# Patient Record
Sex: MALE | Race: WHITE
[De-identification: names, ages, dates, MRNs, and addresses within clinical notes are randomized per-mention and may not be internally consistent; named-entity substitution may affect disease eponyms.]

---

## 2022-11-07 ENCOUNTER — HOSPITAL ENCOUNTER (OUTPATIENT)
Dept: PHYSICAL THERAPY | Facility: CLINIC | Age: 57
Discharge: HOME | End: 2022-11-07

## 2022-11-07 PROCEDURE — 9900000067 HC THERAPEUTIC EXERCISE EA 15 MINS (SELF-PAY)

## 2022-11-07 PROCEDURE — 9900000066 HC EVALUATION (SELF-PAY)

## 2022-11-07 NOTE — CONSULTS
[x] [address]   Outpatient Rehabilitation &  Therapy  [address] [facility]  P: [phone]  F: [phone] [x] [address] [facility]  P: [phone]  F: [phone]        Physical Therapy Running Evaluation    Date:  2022  Patient: Abdelrahman Goldstein   : 1965  MRN: [id number]  Physician: Dr. Abdias Heck: self pay   Medical Diagnosis: R hamstring strain  Rehab Codes: M79.661  Onset date: 2019   Next 's appt.: none    Subjective:   CC: R hamstring pain with running and recurrent Marcus calf strains with running  HPI: ~3 years ago intense pain in Amrcus calf after intense training. L AT repair and no issues. ~1 year ago, running and had a R hamstring pull. Stopped running and recovered. Went to PT at [address] (balance, hamstring and glut strengthening and hamstring stretching). Supine eccentric hamstring curls 1 and 2 legged, calf raises 1 legged con and eccentric,  quad and hip flexor stretches. Had [address]. Wear pattern shows a strong heel strike.    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  Drawer   []   Hip Flex    5 Post. Drawer   []   Ext    5 Lachmans   []   ER    5 Valgus Stress   []   IR    5 Varus Stress   []   ABD    5 Alexandras   []   ADD    5 Pat-Fem Grind   []   Knee Flex     FADIRs   []   Ext    5 Hip Scouring   []   Ankle DF     VALENTÍNs   []   PF     Piriformis   []   INV     Rainas   []   EVER     Willie     []   GTE     Dieudonne's   []       OBSERVATION No Deficit Deficit Not Tested Comments   Posture       Forward Head [] [] []    Rounded Shoulders [] [] []    Kyphosis [] [] []    Lordosis [] [] []    Scoliosis [] [] []    Iliac Crest [] [] []    PSIS [] [] []    ASIS [] [] []    Genu Valgus [] [] []    Genu Varus [] [] []    Genu Recurvatum [] [] []    Pronation [] [] []    Supination [] [] []    Leg Length Discrp [] [] []    Slumped Sitting [] [] []    Palpation [] [] []    Sensation [] [] []    Edema [] [] []    Neurological [] [] []    Patellar Mobility [] [] []    Patellar Orientation [] [] []    Gait [] [x] [] Analysis:     Video Run Analysis   Pace: 930  min/mile    Shoes: Asics   Sylvia:160  spm    Frontal plane deviations:    No significant deviations       Sagittal plane deviations:     Increased knee extension at initial contact    Elevated foot ground angle at initial contact    Decreased knee flexion during swing        Other:      Recommendations:     Increase sylvia by 5% to   166-170 spm    Increase knee flexion during swing    Use a treadmill + metronome as he does not use a garmin    Issued script for [employer], [employer] or [employer]      FUNCTIONAL TESTS PAIN NO PAIN COMMENTS   Step Test  [] [] 4/6/8\"   2 legged squat [] []    1 legged squat [] [x] + valgus      Functional Test: UWRI  Score: 64 % functionally impaired     Comments:  Assessment:Patient would benefit from skilled physical therapy services in order to: improve running mechanics as he demonstrates low linda and increased overstriding. He also demonstrates non neutral hip/pelvis postures  Problems:    [x] ? Pain:      [x] ? Function: Not able to run as she wishes   [x] Postural Deviations  [x] Gait Deviations with low linda and overstriding  [x]  UWRI score is 13/36  [] Other:      STG: (to be met in 5 treatments)  ? Pain:<3/10 with running   ? Function: able to run 15miles/wk without issue  UWRI score to >24/36  Independent with Home Exercise Programs    LTG: (to be met in 10 treatments)  No pain with running  UWRI score to >30/36  Able to run without limitation  To run with a linda of >170 spm                   Patient goals:\"figure out why I have problems, correct them, and determine proper shoes. Rehab Potential:  [x] Good  [] Fair  [] Poor   Suggested Professional Referral:  [x] No  [] Yes:  Barriers to Goal Achievement[de-identified]  [x] No  [] Yes:  Domestic Concerns:  [x] No  [] Yes:    Pt. Education:  [x] Plans/Goals, Risks/Benefits discussed  [x] Home exercise program    Method of Education:   [x] Verbal   Download pro metronome and set to 166-170  Run on treadmill  Verbal cue to lift heels slightly  No need for additional flexibiity   [] Demo  [] Written     Comprehension of Education:  [] Verbalizes understanding. [] Demonstrates understanding. [x] Needs Review. [] Demonstrates/verbalizes understanding of HEP/Ed previously given.     Treatment Plan:  [x] Therapeutic Exercise    [x] Therapeutic Activity  [x] Manual Therapy   [x] Alter G treadmill  [x] Phys perf test     [x] Vasocompression/Game Ready   [x] Neuromuscular Re-education [x] Instruction in HEP     [x] Aquatic Therapy                           Frequency:  1x/week for 10 visits    Todays Treatment:  Modalities: none  Precautions:

## 2022-11-15 ENCOUNTER — HOSPITAL ENCOUNTER (OUTPATIENT)
Dept: PHYSICAL THERAPY | Facility: CLINIC | Age: 57
Discharge: HOME | End: 2022-11-15

## 2022-11-15 PROCEDURE — 9900000067 HC THERAPEUTIC EXERCISE EA 15 MINS (SELF-PAY)

## 2022-11-15 NOTE — FLOWSHEET NOTE
[x] [facility]    [address], [address] [facility]    Phone: [phone]    Fax:  [phone]       Physical Therapy Daily Treatment Note    Date:  11/15/2022  Patient Name:  Lexx Kahn    :  1965  MRN: [id number]  Physician: Dr. Sherwood Model: self pay   Medical Diagnosis: R hamstring strain       Rehab Codes: R93.998  Onset date: 2019                                  Next Dr's appt.: none  Visit# / total visits: 2/10    Cancels/No Shows: 0/0    Subjective:    Pain:  [] Yes  [] No Location: R hamstring  Pain Rating: (0-10 scale) /10  Pain altered Tx:  [x] No  [] Yes  Action:  Comments: ran 3x on the treadmill. He really has to focus on his running mechanics. Presents with [employer] which he likes     Objective:  Modalities: none  Precautions: standard  Exercises:  Exercise Reps/ Time Weight/ Level Issued for HEP   Comments   Gym             Split squats 15   x x    Monster walks 2x25' black x x Issued black TB   Heel taps 15 4\" x x     Step downs 15 6\" x x Posterior and lateral   RDL 15 Red KB x x    LUnge walks 4x20'     x     Functional reach             Reverse twisting lunge             Other:     Specific Instructions for next treatment:   Review run mechanics  Add above functional ex    Treatment Charges: Mins Units   []  Phys perf test     [x]  Ther Exercise 60 4   []  Manual Therapy     []  Ther Activities     []  NMR     []  Vasocompression     []  Other     Total Treatment time         Assessment: [x] Progressing toward goals. He demonstrates a very significant genu valgus tendency with step downs and his functional ex. From a running mechanics standpoint, he was able to demonstrate a higher linda of 172 he doesn't demonstrate a gneu valgus because he lands with a more abducted R LE.  L LE lands appropriately.   He continues to demonstrate a high foot ground angle, which will be a lower priority correction    [] No change. [] Other:    Goals:  STG: (to be met in 5 treatments)  ? Pain:<3/10 with running   ? Function: able to run 15miles/wk without issue  UWRI score to >24/36  Independent with Home Exercise Programs     LTG: (to be met in 10 treatments)  No pain with running  UWRI score to >30/36  Able to run without limitation  To run with a linda of >170 spm                  Patient goals:\"figure out why I have problems, correct them, and determine proper shoes. Pt. Education:  [x] Yes  [] No  [x] Reviewed Prior HEP/Ed  Method of Education:   [x] Verbal   Download pro metronome and set to 166-170  Run on treadmill  Verbal cue to lift heels slightly  No need for additional flexibiity   [x] Demo  [x] Written   Access Code: [email or web address]  URL: [email or web address]  Date: 11/15/2022  Prepared by: Aleksandra Davis    Exercises  Side Stepping with Resistance at Feet - 1 x daily - 3-4 x weekly - 4 sets - 10-15 reps  Split Squat Lunge - 1 x daily - 3-4 x weekly - 2 sets - 15-20 reps  Single Leg Deadlift with Kettlebell - 1 x daily - 3-4 x weekly - 2 sets - 15 reps  Lateral Step Down - 1 x daily - 3-4 x weekly - 2 sets - 15-20 reps  Forward Step Down Touch with Heel - 1 x daily - 3-4 x weekly - 2 sets - 15-20 reps  Modified Side Plank with Hip Abduction - 1 x daily - 3-4 x weekly - 2 sets - 10 reps    Comprehension of Education:  [] Verbalizes understanding. [] Demonstrates understanding. [] Needs review. [] Demonstrates/verbalizes HEP/Ed previously given. Plan: [x] Continue per plan of care.  1x/wk see in 2.5 wks   [] Other:     Time In: 1600          Time Out: [address]    Electronically signed by:  Aleksandra Davis, PT

## 2022-12-01 ENCOUNTER — HOSPITAL ENCOUNTER (OUTPATIENT)
Dept: PHYSICAL THERAPY | Facility: CLINIC | Age: 57
Discharge: HOME | End: 2022-12-01

## 2022-12-01 PROCEDURE — 9900000067 HC THERAPEUTIC EXERCISE EA 15 MINS (SELF-PAY)

## 2022-12-01 NOTE — FLOWSHEET NOTE
[x] [facility]    [address] [place], [place]    Phone: [phone]    Fax:  [phone]       Physical Therapy Daily Treatment Note    Date:  2022  Patient Name:  Bola Ojeda    :  1965  MRN: [id number]  Physician: Dr. Servin : self pay   Medical Diagnosis: R hamstring strain       Rehab Codes: E34.984  Onset date: 2019                                  Next 's appt.: none  Visit# / total visits: 3/10    Cancels/No Shows: 0/0    Subjective:    Pain:  [] Yes  [] No Location: R hamstring  Pain Rating: (0-10 scale) /10  Pain altered Tx:  [x] No  [] Yes  Action:  Comments: saúl was able to purchase garmin 5. Doing HEP. Feeling a little bit better. Ran 2x. 4-4.5 miles. Sylvia was 168-9.       Objective:  Modalities: none  Precautions: standard  Exercises:  Exercise Reps/ Time Weight/ Level Issued for HEP   Comments   Gym             Split squats 15 BOSU x x    Split squat hops 15  x x    Monster walks 2x25' Black/blue x x Issued black feet/blue at knees   Heel taps 15 6\" x x     Step downs 15 12\" x x  lateral   LUnge walks 4x20'     x     Cups 2x6 15/20 lbs   x W/ KB               Other:  Video Run Analysis              Pace: 930  min/mile                  Shoes: Asics              Sylvia:160   spm               Frontal plane deviations:                          No significant deviations                                        Sagittal plane deviations:                           Increased knee extension at initial contact                          Elevated foot ground angle at initial contact                                                                  Other:                  Recommendations:                           Increase sylvia by 5% to   166-170 spm                                                  Specific Instructions for next treatment:   Review run mechanics  Add above functional ex    Treatment Charges: Mins Units   []  Phys perf test     [x]  Ther Exercise 60 4   []  Manual Therapy     []  Ther Activities     []  NMR     []  Vasocompression     []  Other     Total Treatment time         Assessment: [x] Progressing toward goals. Advanced his HEP. Attempted to modify running mechanics as he is forward bent more than recommended. Improved knee flexion at initial contact. Improving running tolerance    [] No change. [] Other:    Goals:  STG: (to be met in 5 treatments)  ? Pain:<3/10 with running   ? Function: able to run 15miles/wk without issue  UWRI score to >24/36  Independent with Home Exercise Programs     LTG: (to be met in 10 treatments)  No pain with running  UWRI score to >30/36  Able to run without limitation  To run with a linda of >170 spm                  Patient goals:\"figure out why I have problems, correct them, and determine proper shoes. Pt. Education:  [x] Yes  [] No  [x] Reviewed Prior HEP/Ed  Method of Education:   [x] Verbal   Download pro metronome and set to 166-170  Run on treadmill  Verbal cue to lift heels slightly  No need for additional flexibiity   [x] Demo  [x] Written   Access Code: [email or web address]  URL: [email or web address]  Date: 12/07/2022  Prepared by: Jamel Sprague    Exercises  Side Stepping with Resistance at Feet - 1 x daily - 3-4 x weekly - 4 sets - 10-15 reps  Split squat hops - 1 x daily - 3-4 x weekly - 2 sets - 15-20 reps  Single Leg Deadlift with Kettlebell - 1 x daily - 3-4 x weekly - 2 sets - 6-10 reps  Lateral Step Down - 1 x daily - 3-4 x weekly - 2 sets - 15-20 reps  Forward Step Down Touch with Heel - 1 x daily - 3-4 x weekly - 2 sets - 15-20 reps  Modified Side Plank with Hip Abduction - 1 x daily - 3-4 x weekly - 2 sets - 10 reps  Side Plank with Hip Drops - 1 x daily - 3-4 x weekly - 2 sets - 10 reps      Comprehension of Education:  [] Verbalizes understanding. [] Demonstrates understanding. [] Needs review.   [] Demonstrates/verbalizes HEP/Ed previously given. Plan: [x] Continue per plan of care.  1x/wk see in 3 wks   [] Other:     Time In: 1700           Time Out: 1800    Electronically signed by:  David Land PT

## 2022-12-22 ENCOUNTER — HOSPITAL ENCOUNTER (OUTPATIENT)
Dept: PHYSICAL THERAPY | Facility: CLINIC | Age: 57
Discharge: HOME | End: 2022-12-22

## 2022-12-22 PROCEDURE — 9900000074 HC THERAPEUTIC ACTIVITIES PER 15 MIN (SELF-PAY)

## 2022-12-22 PROCEDURE — 9900000067 HC THERAPEUTIC EXERCISE EA 15 MINS (SELF-PAY)

## 2022-12-22 NOTE — FLOWSHEET NOTE
[x] [facility]    [address] [facility]    Phone: [phone]    Fax:  [phone]       Physical Therapy Daily Treatment Note    Date:  2022  Patient Name:  Olivia Magaña    :  1965  MRN: [id number]  Physician: Dr. Amelia Avalos: self pay   Medical Diagnosis: R hamstring strain       Rehab Codes: S83.447  Onset date: 2019                                  Next Dr's appt.: none  Visit# / total visits: 4/10    Cancels/No Shows: 0/0    Subjective:    Pain:  [] Yes  [x] No Location: R hamstring  Pain Rating: (0-10 scale) 0/10  Pain altered Tx:  [x] No  [] Yes  Action:  Comments: doing 2x/wk and running 1-2x/wk. Signed up for [facility]. The exercises are still challenging. No pain in hamstring and calves and is feeling very good.      Objective:  Modalities: none  Precautions: standard  Exercises:  Exercise Reps/ Time Weight/ Level Issued for HEP   Comments   Gym             Split squats 15 BOSU x     Split squat hops 15  x     Monster walks 2x25' Black/blue x  Issued black feet/blue at knees   Heel taps 15 6\" x      Step downs 15 12\" x   lateral   LUnge walks 4x20'          Cups 2x6 15/20 lbs    W/ KB               Other:  Video Run Analysis              Pace: 930  min/mile                  Shoes: Asics              Linda:160   spm               Frontal plane deviations:                          No significant deviations                                        Sagittal plane deviations:                           Increased knee extension at initial contact                          Elevated foot ground angle at initial contact                                                                  Other:                  Recommendations:                           Increase linda by 5% to   166-170 spm                                                  Specific Instructions for next treatment:      Treatment Charges: Mins Units   []  Phys perf test     [x]  Ther Exercise 14 1   []  Manual Therapy     [x]  Ther Activities 11 1   []  NMR     []  Vasocompression     []  Other     Total Treatment time 25 2       Assessment: [x] Progressing toward goals. Modified his HEP by decreasing lateral step downs from 12\" to 8\". The significance of the genu valgus was much less with 8\". UWRI score has improved from 13/36 to 33/36. Pt is very pleased with progress. Reviewed linda with speed and maintaining increased linda. [] No change. [] Other:    Goals:  STG: (to be met in 5 treatments)  ? Pain:<3/10 with running   ? Function: able to run 15miles/wk without issue  UWRI score to >24/36  Independent with Home Exercise Programs     LTG: (to be met in 10 treatments)  No pain with running  UWRI score to >30/36  Able to run without limitation  To run with a linda of >170 spm                  Patient goals:\"figure out why I have problems, correct them, and determine proper shoes. Pt. Education:  [x] Yes  [] No  [x] Reviewed Prior HEP/Ed  Method of Education:   [x] Verbal   Download pro metronome and set to 166-170  Run on treadmill  Verbal cue to lift heels slightly  No need for additional flexibiity   [x] Demo  [x] Written   Access Code: [email or web address]  URL: [email or web address]  Date: 12/07/2022  Prepared by: Joana Cosme    Exercises  Side Stepping with Resistance at Feet - 1 x daily - 3-4 x weekly - 4 sets - 10-15 reps  Split squat hops - 1 x daily - 3-4 x weekly - 2 sets - 15-20 reps  Single Leg Deadlift with Kettlebell - 1 x daily - 3-4 x weekly - 2 sets - 6-10 reps  Lateral Step Down - 1 x daily - 3-4 x weekly - 2 sets - 15-20 reps  Forward Step Down Touch with Heel - 1 x daily - 3-4 x weekly - 2 sets - 15-20 reps  Modified Side Plank with Hip Abduction - 1 x daily - 3-4 x weekly - 2 sets - 10 reps  Side Plank with Hip Drops - 1 x daily - 3-4 x weekly - 2 sets - 10 reps      Comprehension of Education:  [] Verbalizes understanding. [] Demonstrates understanding. [] Needs review. [] Demonstrates/verbalizes HEP/Ed previously given. Plan: [] Continue per plan of care.     [x] Other: if no visit by 1/22/23 DC at that time     Time In: 1700           Time Out: 7859    Electronically signed by:  Pooja Mosley, PT

## 2023-01-24 ENCOUNTER — HOSPITAL ENCOUNTER (OUTPATIENT)
Dept: PHYSICAL THERAPY | Facility: CLINIC | Age: 58
Discharge: HOME | End: 2023-01-24

## 2023-01-24 PROCEDURE — 9900000073 HC MANUAL THERAPY PER 15 MIN (SELF-PAY)

## 2023-01-24 PROCEDURE — 9900000067 HC THERAPEUTIC EXERCISE EA 15 MINS (SELF-PAY)

## 2023-01-24 NOTE — FLOWSHEET NOTE
[x] [facility]    [address], [address] [facility]    Phone: [phone]    Fax:  [phone]       Physical Therapy Daily Treatment Note    Date:  2023  Patient Name:  Colt Ards    :  1965  MRN: [id number]  Physician: Dr. Irving Jewell: self pay   Medical Diagnosis: R hamstring strain       Rehab Codes: W79.672  Onset date: 2019                                  Next 's appt.: none  Visit# / total visits: 5/10    Cancels/No Shows: 0/0    Subjective:    Pain:  [] Yes  [x] No Location: R hamstring  Pain Rating: (0-10 scale) 0/10  Pain altered Tx:  [x] No  [] Yes  Action:  Comments: L lateral calf is very sore and he hasn't been able to run    Objective:  Modalities: none  Precautions: standard  Exercises:  Exercise Reps/ Time Weight/ Level Issued for HEP   Comments   Gym             Split squats 15 BOSU x     Split squat hops 15  x     Monster walks 2x25' Black/blue x  Issued black feet/blue at knees   Heel taps 15 6\" x      Step downs 15 12\" x   lateral   LUnge walks 4x20'          Cups 2x6 15/20 lbs    W/ KB   Calf stretch 3x30\" L5   X     Calf raises 2x10   X Eccentric 2up 1 down   Other:  Manual   STM to L lateral head of gastroc  STM to L plantar fascia    Video Run Analysis              Pace: 930  min/mile                  Shoes: Asics              Sylvia:160   spm               Frontal plane deviations:                          No significant deviations                                        Sagittal plane deviations:                           Increased knee extension at initial contact                          Elevated foot ground angle at initial contact                                                                  Other:                  Recommendations:                           Increase sylvia by 5% to   166-170 spm                                                  Specific Instructions for next treatment:      Treatment Charges: Mins Units   []  Phys perf test     [x]  Ther Exercise 15 1   [x]  Manual Therapy 25 2   []  Ther Activities     []  NMR     []  Vasocompression     []  Other     Total Treatment time 40 3       Assessment: [x] Progressing toward goals. Pt is exquisitely tender on lateral head and even more so on the PF. Reviewed how he can perform at home manually. Stretched calf with burrito stretch. He was limited with 1 legged calf raise to failure endurance on the L versus the R.     [] No change. [] Other:    Goals:  STG: (to be met in 5 treatments)  ? Pain:<3/10 with running   ? Function: able to run 15miles/wk without issue  UWRI score to >24/36  Independent with Home Exercise Programs     LTG: (to be met in 10 treatments)  No pain with running  UWRI score to >30/36  Able to run without limitation  To run with a linda of >170 spm                  Patient goals:\"figure out why I have problems, correct them, and determine proper shoes. Pt. Education:  [x] Yes  [] No  [x] Reviewed Prior HEP/Ed  Method of Education:   [x] Verbal   Download pro metronome and set to 166-170  Run on treadmill  Verbal cue to lift heels slightly  No need for additional flexibiity   [x] Demo  [x] Written   Access Code: [email or web address]  URL: [email or web address]  Date: 12/07/2022  Prepared by: Libby Anne    Exercises  Side Stepping with Resistance at Feet - 1 x daily - 3-4 x weekly - 4 sets - 10-15 reps  Split squat hops - 1 x daily - 3-4 x weekly - 2 sets - 15-20 reps  Single Leg Deadlift with Kettlebell - 1 x daily - 3-4 x weekly - 2 sets - 6-10 reps  Lateral Step Down - 1 x daily - 3-4 x weekly - 2 sets - 15-20 reps  Forward Step Down Touch with Heel - 1 x daily - 3-4 x weekly - 2 sets - 15-20 reps  Modified Side Plank with Hip Abduction - 1 x daily - 3-4 x weekly - 2 sets - 10 reps  Side Plank with Hip Drops - 1 x daily - 3-4 x weekly - 2 sets - 10 reps      Comprehension of Education:  [] Verbalizes understanding. [] Demonstrates understanding. [x] Needs review. [] Demonstrates/verbalizes HEP/Ed previously given. Plan: [] Continue per plan of care. [x] Other: he wishes to follow up as needed.       Time In: 1510           Time Out: 1600    Electronically signed by:  Edita Burnham PT

## 2023-02-07 ENCOUNTER — HOSPITAL ENCOUNTER (OUTPATIENT)
Dept: PHYSICAL THERAPY | Facility: CLINIC | Age: 58
Discharge: HOME | End: 2023-02-07

## 2023-02-07 PROCEDURE — 9900000073 HC MANUAL THERAPY PER 15 MIN (SELF-PAY)

## 2023-02-07 PROCEDURE — 9900000067 HC THERAPEUTIC EXERCISE EA 15 MINS (SELF-PAY)

## 2023-02-07 NOTE — FLOWSHEET NOTE
[x] [facility]    [address], [place]    Phone: [phone]    Fax:  [phone]       Physical Therapy Daily Treatment Note    Date:  2023  Patient Name:  Cecile Current    :  1965  MRN: [id number]  Physician: Dr. Aylin Ramirez: self pay   Medical Diagnosis: R hamstring strain       Rehab Codes: J98.399  Onset date: 2019                                  Next Dr's appt.: none  Visit# / total visits: 5/10    Cancels/No Shows: 0/0    Subjective:    Pain:  [] Yes  [x] No Location: R hamstring  Pain Rating: (0-10 scale) 0/10  Pain altered Tx:  [x] No  [] Yes  Action:  Comments: L calf does not hurt unless he runs. Using percussion gun at home. Doing the calf raises and stretching.   Gluts are getting stronger with HEP    Objective:  Modalities: none  Precautions: standard  Exercises:  Exercise Reps/ Time Weight/ Level Issued for HEP   Comments   Gym             Split squats 15 BOSU x     Split squat hops 15  x     Monster walks 2x25' Black/blue x  Issued black feet/blue at knees   Heel taps 15 6\" x      Step downs 15 12\" x   lateral   LUnge walks 4x20'          Cups 2x6 15/20 lbs    W/ KB   Calf stretch 3x30\" L5   X     Calf raises 2x10   X Eccentric 2up 1 down   Other:  Manual   Manual AP mobs to fibular head 3x10    Video Run Analysis              Pace: 930  min/mile                  Shoes: [employer]              Linda:160   spm               Frontal plane deviations:                          No significant deviations                                        Sagittal plane deviations:                           Increased knee extension at initial contact                          Elevated foot ground angle at initial contact                                                                  Other:                  Recommendations:                           Increase linda by 5% to   166-170 spm Specific Instructions for next treatment:      Treatment Charges: Mins Units   []  Phys perf test     [x]  Ther Exercise 20 1   [x]  Manual Therapy 15 1   []  Ther Activities     []  NMR     []  Vasocompression     []  Other     Total Treatment time 35 2       Assessment: [x] Progressing toward goals. Reviewed running mechanics: decreased pronation on L. Continues to land with an extended knee with a higher foot ground angle. He ran 2.07 miles without increased pain. He has only minimal tenderness on L lateral head of gastroc. [] No change. [] Other:    Goals:  STG: (to be met in 5 treatments)  ? Pain:<3/10 with running   ? Function: able to run 15miles/wk without issue  UWRI score to >24/36  Independent with Home Exercise Programs     LTG: (to be met in 10 treatments)  No pain with running  UWRI score to >30/36  Able to run without limitation  To run with a linda of >170 spm                  Patient goals:\"figure out why I have problems, correct them, and determine proper shoes. Pt. Education:  [x] Yes  [] No  [x] Reviewed Prior HEP/Ed  Method of Education:   [x] Verbal   To run 5x2 miles easy and then increase to 14 miles   [x] Demo  [x] Written   Access Code: [email or web address]  URL: [email or web address]  Date: 12/07/2022  Prepared by: Usha Silverman    Exercises  Side Stepping with Resistance at Feet - 1 x daily - 3-4 x weekly - 4 sets - 10-15 reps  Split squat hops - 1 x daily - 3-4 x weekly - 2 sets - 15-20 reps  Single Leg Deadlift with Kettlebell - 1 x daily - 3-4 x weekly - 2 sets - 6-10 reps  Lateral Step Down - 1 x daily - 3-4 x weekly - 2 sets - 15-20 reps  Forward Step Down Touch with Heel - 1 x daily - 3-4 x weekly - 2 sets - 15-20 reps  Modified Side Plank with Hip Abduction - 1 x daily - 3-4 x weekly - 2 sets - 10 reps  Side Plank with Hip Drops - 1 x daily - 3-4 x weekly - 2 sets - 10 reps      Comprehension of Education:  [] Naveen understanding. [] Demonstrates understanding. [x] Needs review. [] Demonstrates/verbalizes HEP/Ed previously given. Plan: [] Continue per plan of care.     [x] Other:if he needs to return to PT, he is to be seen by Sd Valera for John Hemalatha     Time In: 1600           Time Out: [address] [facility]    Electronically signed by:  Ellen Keys, PT